# Patient Record
(demographics unavailable — no encounter records)

---

## 2025-05-08 NOTE — PHYSICAL EXAM
[Appropriately responsive] : appropriately responsive [Alert] : alert [No Acute Distress] : no acute distress [Regular Rate Rhythm] : regular rate rhythm [Oriented x3] : oriented x3 [FreeTextEntry5] : Breathing comfortably on room air

## 2025-05-08 NOTE — HISTORY OF PRESENT ILLNESS
[Patient reported PAP Smear was normal] : Patient reported PAP Smear was normal [Y] : Patient is sexually active [N] : Patient denies prior pregnancies [Menarche Age: ____] : age at menarche was [unfilled] [No] : Patient does not have concerns regarding sex [Currently Active] : currently active [PapSmeardate] : 2/2023 [LMPDate] : 4/16/25 [PGHxTotal] : 0 [FreeTextEntry1] : 4/16/25

## 2025-05-08 NOTE — DISCUSSION/SUMMARY
[FreeTextEntry1] : She desires to start hormonal contraceptive with the vaginal ring.  She has no known contraindications to combined hormonal contraceptive medication.  Medication sent to the pharmacy and instructed to start on the first day of her menses.  Advised to take a pregnancy test as her menses is late.  Return to office in 3 months for contraceptive follow-up and annual GYN exam.
I independently performed the documented: